# Patient Record
Sex: FEMALE | Race: BLACK OR AFRICAN AMERICAN | NOT HISPANIC OR LATINO | URBAN - METROPOLITAN AREA
[De-identification: names, ages, dates, MRNs, and addresses within clinical notes are randomized per-mention and may not be internally consistent; named-entity substitution may affect disease eponyms.]

---

## 2020-07-25 ENCOUNTER — TELEPHONE ENCOUNTER (OUTPATIENT)
Dept: URBAN - METROPOLITAN AREA CLINIC 13 | Facility: CLINIC | Age: 25
End: 2020-07-25

## 2020-07-26 ENCOUNTER — TELEPHONE ENCOUNTER (OUTPATIENT)
Dept: URBAN - METROPOLITAN AREA CLINIC 13 | Facility: CLINIC | Age: 25
End: 2020-07-26

## 2020-07-26 RX ORDER — GABAPENTIN 300 MG/1
TAKE 1 CAPSULE 3 TIMES DAILY CAPSULE ORAL
Refills: 0 | Status: ACTIVE | COMMUNITY

## 2020-07-26 RX ORDER — ESCITALOPRAM OXALATE 5 MG
TAKE 1 TABLET DAILY PRN TABLET ORAL
Refills: 0 | Status: ACTIVE | COMMUNITY

## 2020-07-26 RX ORDER — NATALIZUMAB 300 MG/15ML
INFUSE ML  1 INJECTION PER MONTH INJECTION INTRAVENOUS
Refills: 0 | Status: ACTIVE | COMMUNITY

## 2020-07-26 RX ORDER — PANTOPRAZOLE SODIUM 40 MG/1
TAKE 1 TABLET DAILY TABLET, DELAYED RELEASE ORAL
Qty: 1 | Refills: 5 | Status: ACTIVE | COMMUNITY
Start: 2017-03-07

## 2024-08-06 ENCOUNTER — LAB OUTSIDE AN ENCOUNTER (OUTPATIENT)
Dept: URBAN - METROPOLITAN AREA CLINIC 17 | Facility: CLINIC | Age: 29
End: 2024-08-06

## 2024-08-06 ENCOUNTER — OFFICE VISIT (OUTPATIENT)
Dept: URBAN - METROPOLITAN AREA CLINIC 17 | Facility: CLINIC | Age: 29
End: 2024-08-06
Payer: COMMERCIAL

## 2024-08-06 ENCOUNTER — DASHBOARD ENCOUNTERS (OUTPATIENT)
Age: 29
End: 2024-08-06

## 2024-08-06 VITALS
HEART RATE: 91 BPM | WEIGHT: 144 LBS | BODY MASS INDEX: 26.5 KG/M2 | SYSTOLIC BLOOD PRESSURE: 122 MMHG | HEIGHT: 62 IN | TEMPERATURE: 97.5 F | DIASTOLIC BLOOD PRESSURE: 87 MMHG

## 2024-08-06 DIAGNOSIS — K21.9 GERD WITHOUT ESOPHAGITIS: ICD-10-CM

## 2024-08-06 DIAGNOSIS — G35 MS (MULTIPLE SCLEROSIS): ICD-10-CM

## 2024-08-06 PROBLEM — 24700007: Status: ACTIVE | Noted: 2024-08-06

## 2024-08-06 PROBLEM — 266435005: Status: ACTIVE | Noted: 2024-08-06

## 2024-08-06 PROCEDURE — 99204 OFFICE O/P NEW MOD 45 MIN: CPT | Performed by: INTERNAL MEDICINE

## 2024-08-06 RX ORDER — OMEPRAZOLE 40 MG/1
1 CAPSULE 30 MINUTES BEFORE MORNING MEAL CAPSULE, DELAYED RELEASE ORAL ONCE A DAY
Status: ACTIVE | COMMUNITY

## 2024-08-06 RX ORDER — OMEPRAZOLE 40 MG/1: 1 CAPSULE 30 MINUTES BEFORE MORNING MEAL CAPSULE, DELAYED RELEASE ORAL ONCE A DAY

## 2024-08-06 RX ORDER — GABAPENTIN 300 MG/1
TAKE 1 CAPSULE 3 TIMES DAILY CAPSULE ORAL
Refills: 0 | Status: ACTIVE | COMMUNITY

## 2024-08-06 RX ORDER — OCRELIZUMAB 300 MG/10ML
AS DIRECTED INJECTION INTRAVENOUS
Status: ACTIVE | COMMUNITY

## 2024-08-06 NOTE — HPI-TODAY'S VISIT:
This is a 30 yo with a PMHx of MS F referred by Dr Soledad Turner for a GERD.  The patient epigastric pain and nighttime burning.   Her sx are exacerbated with spicy foods.  Nexium is no longer effective so it was replaced with Omeprazole which improved chest pain.  She denies nausea, vomiting and dysphagia.  She admits to early satiety. IV steroids for MS worsen her symptoms as well.

## 2024-08-06 NOTE — PHYSICAL EXAM RECTAL:
normal tone, no external hemorrhoids, no masses palpable, no red blood, Tenderness on PRIMITIVO, Internal hemorrhoids present

## 2024-08-26 ENCOUNTER — OFFICE VISIT (OUTPATIENT)
Dept: URBAN - METROPOLITAN AREA SURGERY CENTER 16 | Facility: SURGERY CENTER | Age: 29
End: 2024-08-26